# Patient Record
Sex: FEMALE | Race: WHITE | NOT HISPANIC OR LATINO | URBAN - METROPOLITAN AREA
[De-identification: names, ages, dates, MRNs, and addresses within clinical notes are randomized per-mention and may not be internally consistent; named-entity substitution may affect disease eponyms.]

---

## 2017-04-19 ENCOUNTER — OUTPATIENT (OUTPATIENT)
Dept: OUTPATIENT SERVICES | Facility: HOSPITAL | Age: 67
LOS: 1 days | End: 2017-04-19
Payer: COMMERCIAL

## 2017-04-19 PROCEDURE — 93880 EXTRACRANIAL BILAT STUDY: CPT | Mod: 26

## 2017-10-07 ENCOUNTER — TRANSCRIPTION ENCOUNTER (OUTPATIENT)
Age: 67
End: 2017-10-07

## 2019-04-15 PROBLEM — Z00.00 ENCOUNTER FOR PREVENTIVE HEALTH EXAMINATION: Status: ACTIVE | Noted: 2019-04-15

## 2019-04-25 ENCOUNTER — APPOINTMENT (OUTPATIENT)
Dept: MAMMOGRAPHY | Facility: CLINIC | Age: 69
End: 2019-04-25
Payer: MEDICARE

## 2019-04-25 PROCEDURE — 77067 SCR MAMMO BI INCL CAD: CPT

## 2019-08-01 ENCOUNTER — APPOINTMENT (OUTPATIENT)
Dept: MRI IMAGING | Facility: CLINIC | Age: 69
End: 2019-08-01
Payer: MEDICARE

## 2019-08-01 PROCEDURE — 72100 X-RAY EXAM L-S SPINE 2/3 VWS: CPT

## 2019-08-01 PROCEDURE — 72072 X-RAY EXAM THORAC SPINE 3VWS: CPT

## 2020-06-26 ENCOUNTER — APPOINTMENT (OUTPATIENT)
Dept: ULTRASOUND IMAGING | Facility: CLINIC | Age: 70
End: 2020-06-26
Payer: MEDICARE

## 2020-06-26 ENCOUNTER — APPOINTMENT (OUTPATIENT)
Dept: MAMMOGRAPHY | Facility: CLINIC | Age: 70
End: 2020-06-26

## 2020-06-26 PROCEDURE — 77063 BREAST TOMOSYNTHESIS BI: CPT

## 2020-06-26 PROCEDURE — 76641 ULTRASOUND BREAST COMPLETE: CPT | Mod: 50

## 2020-06-26 PROCEDURE — 77067 SCR MAMMO BI INCL CAD: CPT

## 2020-12-15 ENCOUNTER — OUTPATIENT (OUTPATIENT)
Dept: OUTPATIENT SERVICES | Facility: HOSPITAL | Age: 70
LOS: 1 days | End: 2020-12-15

## 2021-10-13 ENCOUNTER — APPOINTMENT (OUTPATIENT)
Dept: ULTRASOUND IMAGING | Facility: CLINIC | Age: 71
End: 2021-10-13

## 2021-10-13 ENCOUNTER — APPOINTMENT (OUTPATIENT)
Dept: RADIOLOGY | Facility: CLINIC | Age: 71
End: 2021-10-13

## 2021-10-13 ENCOUNTER — APPOINTMENT (OUTPATIENT)
Dept: MAMMOGRAPHY | Facility: CLINIC | Age: 71
End: 2021-10-13
Payer: MEDICARE

## 2021-10-13 PROCEDURE — 77080 DXA BONE DENSITY AXIAL: CPT

## 2021-10-13 PROCEDURE — 77067 SCR MAMMO BI INCL CAD: CPT

## 2021-10-13 PROCEDURE — 76641 ULTRASOUND BREAST COMPLETE: CPT | Mod: 50

## 2021-10-13 PROCEDURE — 77063 BREAST TOMOSYNTHESIS BI: CPT

## 2021-10-22 ENCOUNTER — APPOINTMENT (OUTPATIENT)
Dept: ULTRASOUND IMAGING | Facility: CLINIC | Age: 71
End: 2021-10-22

## 2021-10-22 ENCOUNTER — APPOINTMENT (OUTPATIENT)
Dept: MAMMOGRAPHY | Facility: CLINIC | Age: 71
End: 2021-10-22
Payer: MEDICARE

## 2021-10-22 PROCEDURE — G0279: CPT | Mod: LT

## 2021-10-22 PROCEDURE — 77065 DX MAMMO INCL CAD UNI: CPT | Mod: LT

## 2022-09-15 ENCOUNTER — RESULT REVIEW (OUTPATIENT)
Age: 72
End: 2022-09-15

## 2022-10-06 ENCOUNTER — APPOINTMENT (OUTPATIENT)
Dept: MAMMOGRAPHY | Facility: CLINIC | Age: 72
End: 2022-10-06

## 2022-10-06 PROCEDURE — 77067 SCR MAMMO BI INCL CAD: CPT

## 2022-10-06 PROCEDURE — 77063 BREAST TOMOSYNTHESIS BI: CPT

## 2022-11-22 ENCOUNTER — OFFICE (OUTPATIENT)
Dept: URBAN - METROPOLITAN AREA CLINIC 38 | Facility: CLINIC | Age: 72
Setting detail: OPHTHALMOLOGY
End: 2022-11-22
Payer: MEDICARE

## 2022-11-22 DIAGNOSIS — H16.223: ICD-10-CM

## 2022-11-22 DIAGNOSIS — H40.1131: ICD-10-CM

## 2022-11-22 DIAGNOSIS — H26.492: ICD-10-CM

## 2022-11-22 DIAGNOSIS — Z96.1: ICD-10-CM

## 2022-11-22 DIAGNOSIS — G43.809: ICD-10-CM

## 2022-11-22 PROCEDURE — 99214 OFFICE O/P EST MOD 30 MIN: CPT | Performed by: OPHTHALMOLOGY

## 2022-11-22 PROCEDURE — 92133 CPTRZD OPH DX IMG PST SGM ON: CPT | Performed by: OPHTHALMOLOGY

## 2022-11-22 PROCEDURE — 92083 EXTENDED VISUAL FIELD XM: CPT | Performed by: OPHTHALMOLOGY

## 2022-11-22 ASSESSMENT — REFRACTION_MANIFEST
OS_VA1: 20/20
OD_ADD: +2.75
OS_AXIS: 100
OS_CYLINDER: -2.00
OS_CYLINDER: -1.75
OD_VA2: 20/20(J1+)
OS_SPHERE: +0.75
OD_ADD: +2.75
OS_AXIS: 090
OD_SPHERE: +0.50
OS_VA2: 20/20(J1+)
OD_AXIS: 100
OD_VA1: 20/20
OS_VA1: 20/20-
OD_SPHERE: +0.50
OD_CYLINDER: -2.00
OS_ADD: +2.75
OD_VA1: 20/20
OD_CYLINDER: -1.75
OS_SPHERE: +1.00
OS_ADD: +2.75
OD_AXIS: 095

## 2022-11-22 ASSESSMENT — AXIALLENGTH_DERIVED
OS_AL: 23.3196
OD_AL: 23.5087
OS_AL: 23.3673
OD_AL: 23.5087
OD_AL: 23.5572
OS_AL: 23.4632

## 2022-11-22 ASSESSMENT — REFRACTION_CURRENTRX
OD_OVR_VA: 20/
OS_VPRISM_DIRECTION: SV
OD_SPHERE: +2.50
OS_ADD: +2.75
OD_VPRISM_DIRECTION: PROGS
OS_SPHERE: +2.50
OS_OVR_VA: 20/
OD_ADD: +2.75
OS_SPHERE: +1.00
OS_CYLINDER: -1.75
OS_AXIS: 097
OD_OVR_VA: 20/
OS_OVR_VA: 20/
OS_VPRISM_DIRECTION: PROGS
OD_CYLINDER: -1.75
OD_SPHERE: +0.50
OD_VPRISM_DIRECTION: SV
OD_AXIS: 097

## 2022-11-22 ASSESSMENT — REFRACTION_AUTOREFRACTION
OS_CYLINDER: -2.75
OD_CYLINDER: -2.25
OD_AXIS: 091
OS_SPHERE: +1.00
OD_SPHERE: +0.75
OS_AXIS: 090

## 2022-11-22 ASSESSMENT — SPHEQUIV_DERIVED
OD_SPHEQUIV: -0.375
OS_SPHEQUIV: 0
OS_SPHEQUIV: -0.125
OD_SPHEQUIV: -0.375
OS_SPHEQUIV: -0.375
OD_SPHEQUIV: -0.5

## 2022-11-22 ASSESSMENT — KERATOMETRY
METHOD_AUTO_MANUAL: AUTO
OD_K2POWER_DIOPTERS: 45.00
OD_AXISANGLE_DEGREES: 001
OS_AXISANGLE_DEGREES: 002
OD_K1POWER_DIOPTERS: 43.25
OS_K2POWER_DIOPTERS: 45.50
OS_K1POWER_DIOPTERS: 43.00

## 2022-11-22 ASSESSMENT — PACHYMETRY
OD_CT_UM: 508
OS_CT_UM: 502
OS_CT_CORRECTION: 3
OD_CT_CORRECTION: 3

## 2022-11-22 ASSESSMENT — CONFRONTATIONAL VISUAL FIELD TEST (CVF)
OD_FINDINGS: FULL
OS_FINDINGS: FULL

## 2022-11-22 ASSESSMENT — VISUAL ACUITY
OS_BCVA: 20/25-1
OD_BCVA: 20/25-1

## 2022-11-22 ASSESSMENT — SUPERFICIAL PUNCTATE KERATITIS (SPK)
OD_SPK: T
OS_SPK: T

## 2022-11-22 ASSESSMENT — TONOMETRY
OD_IOP_MMHG: 17
OS_IOP_MMHG: 17

## 2023-03-21 ENCOUNTER — OFFICE (OUTPATIENT)
Dept: URBAN - METROPOLITAN AREA CLINIC 38 | Facility: CLINIC | Age: 73
Setting detail: OPHTHALMOLOGY
End: 2023-03-21
Payer: MEDICARE

## 2023-03-21 VITALS — HEIGHT: 55 IN

## 2023-03-21 DIAGNOSIS — H26.492: ICD-10-CM

## 2023-03-21 DIAGNOSIS — H35.363: ICD-10-CM

## 2023-03-21 DIAGNOSIS — H43.812: ICD-10-CM

## 2023-03-21 DIAGNOSIS — H16.223: ICD-10-CM

## 2023-03-21 DIAGNOSIS — Z96.1: ICD-10-CM

## 2023-03-21 DIAGNOSIS — H40.1131: ICD-10-CM

## 2023-03-21 PROCEDURE — 92250 FUNDUS PHOTOGRAPHY W/I&R: CPT | Performed by: OPTOMETRIST

## 2023-03-21 PROCEDURE — 92014 COMPRE OPH EXAM EST PT 1/>: CPT | Performed by: OPTOMETRIST

## 2023-03-21 ASSESSMENT — REFRACTION_MANIFEST
OD_VA2: 20/20(J1+)
OD_ADD: +2.25
OS_ADD: +2.75
OS_CYLINDER: -1.75
OU_VA: 20/20
OS_VA2: 20/20(J1+)
OS_AXIS: 090
OD_AXIS: 090
OD_CYLINDER: -1.75
OD_ADD: +2.75
OD_VA1: 20/20
OD_SPHERE: +0.75
OS_SPHERE: +0.75
OS_ADD: +2.25
OD_SPHERE: +0.50
OD_AXIS: 095
OS_AXIS: 090
OS_VA1: 20/20
OS_CYLINDER: -1.50
OS_VA1: 20/20
OD_VA1: 20/20
OD_CYLINDER: -1.75
OS_SPHERE: +0.75

## 2023-03-21 ASSESSMENT — REFRACTION_CURRENTRX
OD_ADD: +2.25
OD_OVR_VA: 20/
OS_SPHERE: +2.50
OD_SPHERE: +0.75
OS_OVR_VA: 20/
OS_ADD: +2.25
OD_CYLINDER: -1.75
OS_SPHERE: +0.75
OS_AXIS: 099
OD_AXIS: 110
OD_VPRISM_DIRECTION: SV
OS_VPRISM_DIRECTION: PROGS
OD_VPRISM_DIRECTION: PROGS
OD_OVR_VA: 20/
OS_OVR_VA: 20/
OS_CYLINDER: -1.50
OD_SPHERE: +2.50
OS_VPRISM_DIRECTION: SV

## 2023-03-21 ASSESSMENT — SPHEQUIV_DERIVED
OS_SPHEQUIV: -0.125
OD_SPHEQUIV: -0.125
OS_SPHEQUIV: 0
OD_SPHEQUIV: -0.125
OD_SPHEQUIV: -0.375
OS_SPHEQUIV: -0.5

## 2023-03-21 ASSESSMENT — SUPERFICIAL PUNCTATE KERATITIS (SPK)
OS_SPK: T
OD_SPK: T

## 2023-03-21 ASSESSMENT — REFRACTION_AUTOREFRACTION
OD_AXIS: 090
OS_AXIS: 091
OS_CYLINDER: -3.00
OD_SPHERE: +1.00
OD_CYLINDER: -2.25
OS_SPHERE: +1.00

## 2023-03-21 ASSESSMENT — KERATOMETRY
OS_AXISANGLE_DEGREES: 004
METHOD_AUTO_MANUAL: AUTO
OD_K1POWER_DIOPTERS: 43.50
OS_K1POWER_DIOPTERS: 43.25
OS_K2POWER_DIOPTERS: 45.25
OD_K2POWER_DIOPTERS: 45.00
OD_AXISANGLE_DEGREES: 004

## 2023-03-21 ASSESSMENT — AXIALLENGTH_DERIVED
OD_AL: 23.3673
OD_AL: 23.4632
OS_AL: 23.5115
OD_AL: 23.3673
OS_AL: 23.3196
OS_AL: 23.3673

## 2023-03-21 ASSESSMENT — VISUAL ACUITY
OS_BCVA: 20/20-
OD_BCVA: 20/25

## 2023-03-21 ASSESSMENT — CONFRONTATIONAL VISUAL FIELD TEST (CVF)
OD_FINDINGS: FULL
OS_FINDINGS: FULL

## 2023-03-21 ASSESSMENT — PACHYMETRY
OD_CT_UM: 508
OS_CT_CORRECTION: 3
OD_CT_CORRECTION: 3
OS_CT_UM: 502

## 2023-03-21 ASSESSMENT — TONOMETRY
OS_IOP_MMHG: 17
OD_IOP_MMHG: 13

## 2023-05-17 PROBLEM — H40.032 NARROW ANGLE; LEFT EYE: Status: ACTIVE | Noted: 2023-05-17

## 2023-09-19 ENCOUNTER — OFFICE (OUTPATIENT)
Dept: URBAN - METROPOLITAN AREA CLINIC 38 | Facility: CLINIC | Age: 73
Setting detail: OPHTHALMOLOGY
End: 2023-09-19
Payer: MEDICARE

## 2023-09-19 VITALS — HEIGHT: 55 IN

## 2023-09-19 DIAGNOSIS — H16.223: ICD-10-CM

## 2023-09-19 DIAGNOSIS — H35.363: ICD-10-CM

## 2023-09-19 DIAGNOSIS — H40.1131: ICD-10-CM

## 2023-09-19 DIAGNOSIS — H43.812: ICD-10-CM

## 2023-09-19 DIAGNOSIS — H26.492: ICD-10-CM

## 2023-09-19 PROCEDURE — 99213 OFFICE O/P EST LOW 20 MIN: CPT | Performed by: OPTOMETRIST

## 2023-09-19 PROCEDURE — 92133 CPTRZD OPH DX IMG PST SGM ON: CPT | Performed by: OPTOMETRIST

## 2023-09-19 ASSESSMENT — SPHEQUIV_DERIVED
OD_SPHEQUIV: -0.375
OD_SPHEQUIV: -0.125
OS_SPHEQUIV: 0
OD_SPHEQUIV: -0.125
OS_SPHEQUIV: -0.125
OS_SPHEQUIV: -0.5

## 2023-09-19 ASSESSMENT — REFRACTION_MANIFEST
OD_CYLINDER: -1.75
OD_VA1: 20/20
OS_VA2: 20/20(J1+)
OD_SPHERE: +0.75
OU_VA: 20/20
OS_AXIS: 090
OD_SPHERE: +0.50
OD_AXIS: 095
OD_VA2: 20/20(J1+)
OS_VA1: 20/20
OS_SPHERE: +0.75
OS_ADD: +2.25
OS_VA1: 20/20
OD_CYLINDER: -1.75
OD_VA1: 20/20
OS_SPHERE: +0.75
OS_ADD: +2.75
OS_CYLINDER: -1.75
OD_ADD: +2.75
OS_AXIS: 090
OD_AXIS: 090
OS_CYLINDER: -1.50
OD_ADD: +2.25

## 2023-09-19 ASSESSMENT — SUPERFICIAL PUNCTATE KERATITIS (SPK)
OS_SPK: 1+
OD_SPK: 1+

## 2023-09-19 ASSESSMENT — REFRACTION_CURRENTRX
OD_ADD: +2.25
OD_SPHERE: +2.50
OS_OVR_VA: 20/
OS_CYLINDER: -1.50
OS_VPRISM_DIRECTION: PROGS
OS_OVR_VA: 20/
OS_SPHERE: +2.50
OS_VPRISM_DIRECTION: SV
OD_VPRISM_DIRECTION: PROGS
OS_ADD: +2.25
OD_AXIS: 110
OD_OVR_VA: 20/
OD_VPRISM_DIRECTION: SV
OD_SPHERE: +0.75
OS_AXIS: 099
OS_SPHERE: +0.75
OD_CYLINDER: -1.75
OD_OVR_VA: 20/

## 2023-09-19 ASSESSMENT — CONFRONTATIONAL VISUAL FIELD TEST (CVF)
OS_FINDINGS: FULL
OD_FINDINGS: FULL

## 2023-09-19 ASSESSMENT — KERATOMETRY
OD_K2POWER_DIOPTERS: 45.00
OS_AXISANGLE_DEGREES: 004
OS_K1POWER_DIOPTERS: 43.25
OD_K1POWER_DIOPTERS: 43.50
OS_K2POWER_DIOPTERS: 45.25
METHOD_AUTO_MANUAL: AUTO
OD_AXISANGLE_DEGREES: 004

## 2023-09-19 ASSESSMENT — AXIALLENGTH_DERIVED
OS_AL: 23.5115
OS_AL: 23.3196
OD_AL: 23.3673
OD_AL: 23.4632
OS_AL: 23.3673
OD_AL: 23.3673

## 2023-09-19 ASSESSMENT — REFRACTION_AUTOREFRACTION
OS_CYLINDER: -3.00
OD_CYLINDER: -2.25
OS_AXIS: 091
OD_SPHERE: +1.00
OS_SPHERE: +1.00
OD_AXIS: 090

## 2023-09-19 ASSESSMENT — PACHYMETRY
OD_CT_UM: 508
OS_CT_CORRECTION: 3
OS_CT_UM: 502
OD_CT_CORRECTION: 3

## 2023-09-19 ASSESSMENT — VISUAL ACUITY
OD_BCVA: 20/20
OS_BCVA: 20/20-

## 2023-10-11 ENCOUNTER — APPOINTMENT (OUTPATIENT)
Dept: MAMMOGRAPHY | Facility: CLINIC | Age: 73
End: 2023-10-11
Payer: MEDICARE

## 2023-10-11 PROCEDURE — 77063 BREAST TOMOSYNTHESIS BI: CPT | Mod: 26

## 2023-10-11 PROCEDURE — 77067 SCR MAMMO BI INCL CAD: CPT | Mod: 26

## 2023-10-17 ENCOUNTER — RESULT REVIEW (OUTPATIENT)
Age: 73
End: 2023-10-17

## 2023-10-17 DIAGNOSIS — E04.2 NONTOXIC MULTINODULAR GOITER: ICD-10-CM

## 2024-02-01 ENCOUNTER — APPOINTMENT (OUTPATIENT)
Dept: ENDOCRINOLOGY | Facility: CLINIC | Age: 74
End: 2024-02-01
Payer: MEDICARE

## 2024-02-01 VITALS
HEART RATE: 78 BPM | HEIGHT: 64 IN | TEMPERATURE: 98.4 F | RESPIRATION RATE: 12 BRPM | WEIGHT: 129 LBS | OXYGEN SATURATION: 96 % | SYSTOLIC BLOOD PRESSURE: 110 MMHG | DIASTOLIC BLOOD PRESSURE: 64 MMHG | BODY MASS INDEX: 22.02 KG/M2

## 2024-02-01 PROCEDURE — 99213 OFFICE O/P EST LOW 20 MIN: CPT

## 2024-02-01 RX ORDER — VITAMIN E 200 UNIT
500 CAPSULE ORAL
Refills: 0 | Status: ACTIVE | COMMUNITY

## 2024-02-01 RX ORDER — MULTIVIT-MIN/IRON/FOLIC ACID/K 18-600-40
CAPSULE ORAL
Refills: 0 | Status: ACTIVE | COMMUNITY

## 2024-02-01 RX ORDER — ROSUVASTATIN CALCIUM 20 MG/1
20 TABLET, FILM COATED ORAL
Refills: 0 | Status: ACTIVE | COMMUNITY

## 2024-02-01 RX ORDER — TRAVOPROST 0.04 MG/ML
0 SOLUTION/ DROPS OPHTHALMIC
Refills: 0 | Status: ACTIVE | COMMUNITY

## 2024-02-01 RX ORDER — ASPIRIN 81 MG
81 TABLET, DELAYED RELEASE (ENTERIC COATED) ORAL
Refills: 0 | Status: ACTIVE | COMMUNITY

## 2024-02-01 RX ORDER — CALCIUM CITRATE/VITAMIN D3 315MG-6.25
TABLET ORAL
Refills: 0 | Status: ACTIVE | COMMUNITY

## 2024-02-01 NOTE — PHYSICAL EXAM
[Alert] : alert [Well Nourished] : well nourished [No Acute Distress] : no acute distress [Well Developed] : well developed [Normal Sclera/Conjunctiva] : normal sclera/conjunctiva [EOMI] : extra ocular movement intact [No Proptosis] : no proptosis [Normal Oropharynx] : the oropharynx was normal [Thyroid Not Enlarged] : the thyroid was not enlarged [No Thyroid Nodules] : no palpable thyroid nodules [No Respiratory Distress] : no respiratory distress [No Accessory Muscle Use] : no accessory muscle use [Clear to Auscultation] : lungs were clear to auscultation bilaterally [Normal S1, S2] : normal S1 and S2 [Normal Rate] : heart rate was normal [Regular Rhythm] : with a regular rhythm [No Edema] : no peripheral edema [Pedal Pulses Normal] : the pedal pulses are present [Normal Bowel Sounds] : normal bowel sounds [Not Tender] : non-tender [Not Distended] : not distended [Soft] : abdomen soft [Normal Anterior Cervical Nodes] : no anterior cervical lymphadenopathy [Normal Posterior Cervical Nodes] : no posterior cervical lymphadenopathy [No Spinal Tenderness] : no spinal tenderness [Spine Straight] : spine straight [No Stigmata of Cushings Syndrome] : no stigmata of Cushings Syndrome [Normal Gait] : normal gait [Normal Strength/Tone] : muscle strength and tone were normal [No Rash] : no rash [Acanthosis Nigricans] : no acanthosis nigricans [Normal Reflexes] : deep tendon reflexes were 2+ and symmetric [No Tremors] : no tremors [Oriented x3] : oriented to person, place, and time [de-identified] : Bilaterally palpable thyroid nodules especially in the right middle pole which appears to be more than 1-1/2 cm in size smooth and nontender and on the left lower pole smaller size nodule which is also nontender.

## 2024-02-01 NOTE — HISTORY OF PRESENT ILLNESS
[FreeTextEntry1] : Patient last seen 4/18/2023 most recent labs dated for 10/05/2023 from [facility], US thyroid from 9/15/2022. This is a pleasant 74-year-old white female with a past medical history of a nontoxic multinodular goiter presents for routine follow-up and evaluation.  Patient was last seen in the office in the year 2022.  She denies any significant symptoms of difficulty swallowing hoarseness or weight loss.  She has not noticed any palpitations chest pain, heat or cold intolerance, or any hair loss.  Physically she is active.  She does complain of pain and numbness of the left arm especially when she wakes up in the morning.  Has a history of a fine-needle aspiration biopsy of the thyroid nodules few years ago which was reported to be benign.

## 2024-02-01 NOTE — ASSESSMENT
[FreeTextEntry1] : Pleasant middle-age white female with no significant past medical history except for hyperlipidemia and a multinodular goiter presents for routine follow-up.  She denies any signs or symptoms of hypo or hyperthyroidism.  Patient had a blood test performed in October 2023 by her primary care physician which showed that the TSH level was normal at 1.80.  Physical exam appears to be stable and I do not appreciate any significant change in the size of the thyroid nodules.  Her last sonogram was performed into the timbre of 2022.  Recommendation 1.  I have advised the patient to obtain a follow-up sonogram of the thyroid so that we can assess for any significant changes in the size and structure of the thyroid nodules 2.  If the nodules are stable then we would not perform any fine-needle aspiration biopsy. 3.  Patient currently does not require any intervention with thyroid medications that the TSH level is normal. 4.  If the patient condition is stable then she will return for follow-up evaluation in 1 years time.  The plan discussed with the patient thank you

## 2024-02-15 ENCOUNTER — RESULT REVIEW (OUTPATIENT)
Age: 74
End: 2024-02-15

## 2024-02-15 ENCOUNTER — APPOINTMENT (OUTPATIENT)
Dept: ULTRASOUND IMAGING | Facility: CLINIC | Age: 74
End: 2024-02-15
Payer: MEDICARE

## 2024-02-15 PROCEDURE — 76536 US EXAM OF HEAD AND NECK: CPT

## 2024-03-19 ENCOUNTER — OFFICE (OUTPATIENT)
Dept: URBAN - METROPOLITAN AREA CLINIC 38 | Facility: CLINIC | Age: 74
Setting detail: OPHTHALMOLOGY
End: 2024-03-19
Payer: MEDICARE

## 2024-03-19 DIAGNOSIS — H40.1131: ICD-10-CM

## 2024-03-19 DIAGNOSIS — H26.492: ICD-10-CM

## 2024-03-19 DIAGNOSIS — H43.812: ICD-10-CM

## 2024-03-19 DIAGNOSIS — H16.223: ICD-10-CM

## 2024-03-19 DIAGNOSIS — Z96.1: ICD-10-CM

## 2024-03-19 DIAGNOSIS — H35.363: ICD-10-CM

## 2024-03-19 PROBLEM — H35.40 PERIPAPILLARY ATROPHY ; BOTH EYES: Status: ACTIVE | Noted: 2024-03-19

## 2024-03-19 PROCEDURE — 92250 FUNDUS PHOTOGRAPHY W/I&R: CPT | Performed by: OPTOMETRIST

## 2024-03-19 PROCEDURE — 92014 COMPRE OPH EXAM EST PT 1/>: CPT | Performed by: OPTOMETRIST

## 2024-03-19 ASSESSMENT — REFRACTION_CURRENTRX
OS_ADD: +3.00
OS_VPRISM_DIRECTION: PROGS
OS_CYLINDER: -1.75
OD_CYLINDER: -1.75
OS_SPHERE: +0.75
OD_AXIS: 110
OD_OVR_VA: 20/
OD_VPRISM_DIRECTION: PROGS
OD_OVR_VA: 20/
OS_CYLINDER: -1.50
OS_OVR_VA: 20/
OD_SPHERE: +0.75
OD_VPRISM_DIRECTION: PROGS
OD_ADD: +3.25
OS_OVR_VA: 20/
OS_AXIS: 099
OD_CYLINDER: -1.75
OD_AXIS: 091
OD_SPHERE: +0.50
OS_VPRISM_DIRECTION: PROGS
OD_ADD: +2.25
OS_ADD: +2.25
OS_AXIS: 097
OS_SPHERE: +1.00

## 2024-03-19 ASSESSMENT — REFRACTION_MANIFEST
OS_AXIS: 090
OD_ADD: +2.75
OD_CYLINDER: -1.75
OD_VA2: 20/20(J1+)
OS_CYLINDER: -1.75
OS_VA2: 20/20(J1+)
OD_VA1: 20/20
OD_CYLINDER: -1.75
OS_ADD: +2.25
OS_ADD: +2.75
OS_VA1: 20/20
OD_AXIS: 090
OS_AXIS: 090
OD_VA1: 20/20
OS_SPHERE: +0.75
OD_AXIS: 095
OS_SPHERE: +0.75
OD_SPHERE: +0.50
OS_VA1: 20/20
OD_ADD: +2.25
OS_CYLINDER: -1.50
OU_VA: 20/20
OD_SPHERE: +0.75

## 2024-03-19 ASSESSMENT — SPHEQUIV_DERIVED
OS_SPHEQUIV: 0
OD_SPHEQUIV: -0.375
OD_SPHEQUIV: -0.125
OS_SPHEQUIV: -0.125

## 2024-07-21 ENCOUNTER — NON-APPOINTMENT (OUTPATIENT)
Age: 74
End: 2024-07-21

## 2024-08-12 ENCOUNTER — NON-APPOINTMENT (OUTPATIENT)
Age: 74
End: 2024-08-12

## 2024-11-09 ENCOUNTER — APPOINTMENT (OUTPATIENT)
Dept: RADIOLOGY | Facility: CLINIC | Age: 74
End: 2024-11-09
Payer: MEDICARE

## 2024-11-09 ENCOUNTER — APPOINTMENT (OUTPATIENT)
Dept: MAMMOGRAPHY | Facility: CLINIC | Age: 74
End: 2024-11-09

## 2024-11-09 ENCOUNTER — RESULT REVIEW (OUTPATIENT)
Age: 74
End: 2024-11-09

## 2024-11-09 PROCEDURE — 77063 BREAST TOMOSYNTHESIS BI: CPT | Mod: TC

## 2024-11-09 PROCEDURE — 77067 SCR MAMMO BI INCL CAD: CPT | Mod: TC

## 2024-11-09 PROCEDURE — 77080 DXA BONE DENSITY AXIAL: CPT

## 2024-11-19 ENCOUNTER — APPOINTMENT (OUTPATIENT)
Dept: MAMMOGRAPHY | Facility: CLINIC | Age: 74
End: 2024-11-19
Payer: MEDICARE

## 2024-11-19 ENCOUNTER — RESULT REVIEW (OUTPATIENT)
Age: 74
End: 2024-11-19

## 2024-11-19 ENCOUNTER — APPOINTMENT (OUTPATIENT)
Dept: ULTRASOUND IMAGING | Facility: CLINIC | Age: 74
End: 2024-11-19

## 2024-11-19 PROCEDURE — 77065 DX MAMMO INCL CAD UNI: CPT | Mod: LT

## 2024-11-19 PROCEDURE — 76642 ULTRASOUND BREAST LIMITED: CPT | Mod: LT

## 2024-11-19 PROCEDURE — G0279: CPT | Mod: LT

## 2025-03-22 ENCOUNTER — OFFICE (OUTPATIENT)
Dept: URBAN - METROPOLITAN AREA CLINIC 38 | Facility: CLINIC | Age: 75
Setting detail: OPHTHALMOLOGY
End: 2025-03-22
Payer: MEDICARE

## 2025-03-22 DIAGNOSIS — H43.812: ICD-10-CM

## 2025-03-22 DIAGNOSIS — H40.1122: ICD-10-CM

## 2025-03-22 DIAGNOSIS — H40.1111: ICD-10-CM

## 2025-03-22 DIAGNOSIS — H35.363: ICD-10-CM

## 2025-03-22 DIAGNOSIS — H26.492: ICD-10-CM

## 2025-03-22 PROCEDURE — 92133 CPTRZD OPH DX IMG PST SGM ON: CPT

## 2025-03-22 PROCEDURE — 92250 FUNDUS PHOTOGRAPHY W/I&R: CPT

## 2025-03-22 PROCEDURE — 92014 COMPRE OPH EXAM EST PT 1/>: CPT

## 2025-03-22 ASSESSMENT — PACHYMETRY
OS_CT_UM: 502
OD_CT_CORRECTION: 3
OD_CT_UM: 508
OS_CT_CORRECTION: 3

## 2025-03-22 ASSESSMENT — TONOMETRY
OD_IOP_MMHG: 16
OS_IOP_MMHG: 20

## 2025-03-22 ASSESSMENT — CONFRONTATIONAL VISUAL FIELD TEST (CVF)
OS_FINDINGS: FULL
OD_FINDINGS: FULL

## 2025-03-24 ASSESSMENT — REFRACTION_CURRENTRX
OS_SPHERE: +1.00
OS_OVR_VA: 20/
OS_ADD: +3.00
OD_VPRISM_DIRECTION: PROGS
OD_OVR_VA: 20/
OD_VPRISM_DIRECTION: PROGS
OD_SPHERE: +0.50
OD_CYLINDER: -1.75
OD_SPHERE: +0.75
OS_VPRISM_DIRECTION: PROGS
OS_AXIS: 097
OD_ADD: +2.75
OS_VPRISM_DIRECTION: PROGS
OS_CYLINDER: -1.50
OS_ADD: +2.25
OD_OVR_VA: 20/
OD_AXIS: 085
OS_OVR_VA: 20/
OS_SPHERE: +0.75
OD_AXIS: 091
OD_AXIS: 110
OS_SPHERE: +0.75
OD_SPHERE: +0.50
OD_CYLINDER: -1.75
OS_CYLINDER: -1.75
OD_VPRISM_DIRECTION: PROGS
OD_OVR_VA: 20/
OS_OVR_VA: 20/
OS_CYLINDER: -1.50
OS_VPRISM_DIRECTION: PROGS
OD_ADD: +2.25
OS_AXIS: 099
OD_CYLINDER: -1.75
OS_ADD: +2.75
OS_AXIS: 087
OD_ADD: +3.25

## 2025-03-24 ASSESSMENT — SUPERFICIAL PUNCTATE KERATITIS (SPK)
OS_SPK: T 1+
OD_SPK: T 1+

## 2025-03-24 ASSESSMENT — REFRACTION_MANIFEST
OS_CYLINDER: -1.50
OS_VA1: 20/20
OS_ADD: +2.75
OS_VA1: 20/20
OU_VA: 20/20
OD_SPHERE: +0.50
OS_VA2: 20/20(J1+)
OD_VA1: 20/20
OS_CYLINDER: -1.75
OD_CYLINDER: -1.75
OD_ADD: +2.75
OS_AXIS: 090
OD_VA2: 20/20(J1+)
OD_VA1: 20/20
OD_CYLINDER: -1.75
OS_AXIS: 090
OD_ADD: +2.25
OD_AXIS: 090
OS_SPHERE: +0.75
OD_AXIS: 095
OD_SPHERE: +0.75
OS_SPHERE: +0.75
OS_ADD: +2.25

## 2025-03-24 ASSESSMENT — KERATOMETRY
OD_K1POWER_DIOPTERS: 43.00
METHOD_AUTO_MANUAL: AUTO
OD_AXISANGLE_DEGREES: 002
OS_K1POWER_DIOPTERS: 43.00
OS_AXISANGLE_DEGREES: 004
OD_K2POWER_DIOPTERS: 46.75
OS_K2POWER_DIOPTERS: 45.50

## 2025-03-24 ASSESSMENT — REFRACTION_AUTOREFRACTION
OS_SPHERE: +1.50
OD_SPHERE: +0.75
OD_AXIS: 090
OS_AXIS: 091
OS_CYLINDER: -3.25
OD_CYLINDER: -2.50

## 2025-03-24 ASSESSMENT — VISUAL ACUITY
OD_BCVA: 20/25-2
OS_BCVA: 20/20

## 2025-05-02 ENCOUNTER — RX ONLY (RX ONLY)
Age: 75
End: 2025-05-02

## 2025-05-02 ENCOUNTER — OFFICE (OUTPATIENT)
Dept: URBAN - METROPOLITAN AREA CLINIC 38 | Facility: CLINIC | Age: 75
Setting detail: OPHTHALMOLOGY
End: 2025-05-02
Payer: MEDICARE

## 2025-05-02 DIAGNOSIS — H40.1111: ICD-10-CM

## 2025-05-02 DIAGNOSIS — H40.1122: ICD-10-CM

## 2025-05-02 PROCEDURE — 92020 GONIOSCOPY: CPT | Performed by: STUDENT IN AN ORGANIZED HEALTH CARE EDUCATION/TRAINING PROGRAM

## 2025-05-02 PROCEDURE — 99214 OFFICE O/P EST MOD 30 MIN: CPT | Performed by: STUDENT IN AN ORGANIZED HEALTH CARE EDUCATION/TRAINING PROGRAM

## 2025-05-02 PROCEDURE — 92133 CPTRZD OPH DX IMG PST SGM ON: CPT | Performed by: STUDENT IN AN ORGANIZED HEALTH CARE EDUCATION/TRAINING PROGRAM

## 2025-05-02 PROCEDURE — 92083 EXTENDED VISUAL FIELD XM: CPT | Performed by: STUDENT IN AN ORGANIZED HEALTH CARE EDUCATION/TRAINING PROGRAM

## 2025-05-02 ASSESSMENT — KERATOMETRY
OS_AXISANGLE_DEGREES: 180
OD_K2POWER_DIOPTERS: 45.00
METHOD_AUTO_MANUAL: AUTO
OS_K2POWER_DIOPTERS: 45.25
OS_K1POWER_DIOPTERS: 43.25
OD_K1POWER_DIOPTERS: 43.00
OD_AXISANGLE_DEGREES: 178

## 2025-05-02 ASSESSMENT — REFRACTION_MANIFEST
OS_VA1: 20/20
OD_CYLINDER: -1.75
OD_SPHERE: +0.25
OS_SPHERE: +0.50
OS_CYLINDER: -1.75
OD_AXIS: 095
OD_CYLINDER: -1.75
OS_AXIS: 095
OS_SPHERE: +0.75
OD_SPHERE: +0.50
OS_AXIS: 090
OD_VA1: 20/20
OD_AXIS: 090
OU_VA: 20/20-2
OD_VA2: 20/20
OS_VA1: 20/20
OS_ADD: +2.75
OS_VA2: 20/20
OS_CYLINDER: -1.75
OD_ADD: +2.75
OS_ADD: +2.75
OD_VA1: 20/20
OD_ADD: +2.75

## 2025-05-02 ASSESSMENT — REFRACTION_CURRENTRX
OD_OVR_VA: 20/
OS_CYLINDER: -1.75
OS_ADD: +3.00
OS_SPHERE: +0.75
OD_VPRISM_DIRECTION: PROGS
OD_OVR_VA: 20/
OD_AXIS: 112
OD_ADD: +3.25
OD_OVR_VA: 20/
OS_AXIS: 097
OS_AXIS: 097
OS_ADD: +2.75
OS_OVR_VA: 20/
OD_VPRISM_DIRECTION: PROGS
OD_ADD: +2.75
OD_AXIS: 091
OS_VPRISM_DIRECTION: PROGS
OS_CYLINDER: -1.50
OD_CYLINDER: -1.75
OS_SPHERE: +1.00
OD_AXIS: 085
OD_ADD: +2.75
OD_CYLINDER: -1.75
OS_CYLINDER: -1.50
OS_ADD: +2.75
OD_SPHERE: +0.50
OS_AXIS: 087
OS_SPHERE: +0.75
OS_OVR_VA: 20/
OD_VPRISM_DIRECTION: PROGS
OS_OVR_VA: 20/
OD_SPHERE: +0.75
OD_CYLINDER: -1.75
OD_SPHERE: +0.50
OS_VPRISM_DIRECTION: PROGS
OS_VPRISM_DIRECTION: PROGS

## 2025-05-02 ASSESSMENT — REFRACTION_AUTOREFRACTION
OS_SPHERE: +1.25
OS_CYLINDER: -2.50
OD_SPHERE: +1.00
OS_AXIS: 091
OD_CYLINDER: -2.25
OD_AXIS: 087

## 2025-05-02 ASSESSMENT — CONFRONTATIONAL VISUAL FIELD TEST (CVF)
OD_FINDINGS: FULL
OS_FINDINGS: FULL

## 2025-05-02 ASSESSMENT — TONOMETRY
OD_IOP_MMHG: 17
OS_IOP_MMHG: 17

## 2025-05-02 ASSESSMENT — PACHYMETRY
OD_CT_CORRECTION: 3
OS_CT_CORRECTION: 3
OD_CT_UM: 508
OS_CT_UM: 502

## 2025-05-02 ASSESSMENT — SUPERFICIAL PUNCTATE KERATITIS (SPK)
OD_SPK: T 1+
OS_SPK: T 1+

## 2025-05-02 ASSESSMENT — VISUAL ACUITY
OD_BCVA: 20/25-2
OS_BCVA: 20/20

## 2025-05-23 ENCOUNTER — OFFICE (OUTPATIENT)
Dept: URBAN - METROPOLITAN AREA CLINIC 38 | Facility: CLINIC | Age: 75
Setting detail: OPHTHALMOLOGY
End: 2025-05-23
Payer: MEDICARE

## 2025-05-23 DIAGNOSIS — H40.1122: ICD-10-CM

## 2025-05-23 PROCEDURE — 65855 TRABECULOPLASTY LASER SURG: CPT | Mod: LT | Performed by: STUDENT IN AN ORGANIZED HEALTH CARE EDUCATION/TRAINING PROGRAM

## 2025-05-23 ASSESSMENT — REFRACTION_MANIFEST
OD_SPHERE: +0.50
OD_AXIS: 090
OS_VA1: 20/20
OS_SPHERE: +0.50
OD_CYLINDER: -1.75
OD_VA2: 20/20
OS_VA2: 20/20
OS_ADD: +2.75
OS_CYLINDER: -1.75
OD_CYLINDER: -1.75
OD_VA1: 20/20
OD_AXIS: 095
OD_ADD: +2.75
OU_VA: 20/20-2
OS_CYLINDER: -1.75
OS_SPHERE: +0.75
OS_AXIS: 095
OD_ADD: +2.75
OS_AXIS: 090
OD_SPHERE: +0.25
OD_VA1: 20/20
OS_ADD: +2.75
OS_VA1: 20/20

## 2025-05-23 ASSESSMENT — REFRACTION_CURRENTRX
OS_AXIS: 097
OD_CYLINDER: -1.75
OD_CYLINDER: -1.75
OS_VPRISM_DIRECTION: PROGS
OS_VPRISM_DIRECTION: PROGS
OD_AXIS: 085
OS_SPHERE: +0.75
OD_ADD: +3.25
OD_OVR_VA: 20/
OS_ADD: +3.00
OD_SPHERE: +0.50
OD_ADD: +2.75
OS_SPHERE: +0.75
OD_AXIS: 112
OS_ADD: +2.75
OD_VPRISM_DIRECTION: PROGS
OS_OVR_VA: 20/
OD_OVR_VA: 20/
OS_AXIS: 087
OS_CYLINDER: -1.50
OD_AXIS: 091
OS_OVR_VA: 20/
OD_CYLINDER: -1.75
OS_ADD: +2.75
OS_VPRISM_DIRECTION: PROGS
OD_VPRISM_DIRECTION: PROGS
OD_ADD: +2.75
OS_CYLINDER: -1.50
OS_SPHERE: +1.00
OS_OVR_VA: 20/
OS_CYLINDER: -1.75
OD_SPHERE: +0.75
OD_SPHERE: +0.50
OD_OVR_VA: 20/
OS_AXIS: 097
OD_VPRISM_DIRECTION: PROGS

## 2025-05-23 ASSESSMENT — KERATOMETRY
OD_AXISANGLE_DEGREES: 175
OS_K1POWER_DIOPTERS: 43.50
OD_K2POWER_DIOPTERS: 45.00
OS_K2POWER_DIOPTERS: 45.25
OD_K1POWER_DIOPTERS: 43.25
METHOD_AUTO_MANUAL: AUTO
OS_AXISANGLE_DEGREES: 178

## 2025-05-23 ASSESSMENT — REFRACTION_AUTOREFRACTION
OD_AXIS: 087
OS_CYLINDER: -2.50
OD_CYLINDER: -2.50
OS_SPHERE: +0.75
OS_AXIS: 088
OD_SPHERE: +0.75

## 2025-05-23 ASSESSMENT — VISUAL ACUITY
OS_BCVA: 20/20-2
OD_BCVA: 20/25-2

## 2025-05-23 ASSESSMENT — SUPERFICIAL PUNCTATE KERATITIS (SPK)
OD_SPK: T 1+
OS_SPK: T 1+

## 2025-06-13 ENCOUNTER — OFFICE (OUTPATIENT)
Dept: URBAN - METROPOLITAN AREA CLINIC 38 | Facility: CLINIC | Age: 75
Setting detail: OPHTHALMOLOGY
End: 2025-06-13
Payer: MEDICARE

## 2025-06-13 DIAGNOSIS — H40.1111: ICD-10-CM

## 2025-06-13 PROCEDURE — 65855 TRABECULOPLASTY LASER SURG: CPT | Mod: RT | Performed by: STUDENT IN AN ORGANIZED HEALTH CARE EDUCATION/TRAINING PROGRAM

## 2025-06-13 ASSESSMENT — REFRACTION_CURRENTRX
OD_CYLINDER: -1.75
OD_AXIS: 112
OD_ADD: +3.25
OS_AXIS: 097
OD_SPHERE: +0.75
OD_VPRISM_DIRECTION: PROGS
OD_SPHERE: +0.50
OD_SPHERE: +0.50
OD_ADD: +2.75
OS_AXIS: 097
OD_CYLINDER: -1.75
OD_OVR_VA: 20/
OS_OVR_VA: 20/
OS_VPRISM_DIRECTION: PROGS
OD_AXIS: 091
OD_OVR_VA: 20/
OS_AXIS: 087
OS_SPHERE: +1.00
OS_ADD: +2.75
OS_ADD: +2.75
OS_VPRISM_DIRECTION: PROGS
OS_VPRISM_DIRECTION: PROGS
OD_VPRISM_DIRECTION: PROGS
OS_CYLINDER: -1.75
OS_OVR_VA: 20/
OD_VPRISM_DIRECTION: PROGS
OD_ADD: +2.75
OS_SPHERE: +0.75
OS_ADD: +3.00
OD_CYLINDER: -1.75
OS_CYLINDER: -1.50
OD_AXIS: 085
OS_OVR_VA: 20/
OD_OVR_VA: 20/
OS_CYLINDER: -1.50
OS_SPHERE: +0.75

## 2025-06-13 ASSESSMENT — REFRACTION_MANIFEST
OS_ADD: +2.75
OD_SPHERE: +0.25
OS_ADD: +2.75
OS_VA1: 20/20
OU_VA: 20/20-2
OS_AXIS: 095
OS_VA2: 20/20
OS_CYLINDER: -1.75
OS_SPHERE: +0.50
OD_AXIS: 095
OD_CYLINDER: -1.75
OD_VA1: 20/20
OS_SPHERE: +0.75
OD_ADD: +2.75
OD_VA1: 20/20
OD_AXIS: 090
OD_ADD: +2.75
OS_VA1: 20/20
OD_VA2: 20/20
OD_SPHERE: +0.50
OS_CYLINDER: -1.75
OS_AXIS: 090
OD_CYLINDER: -1.75

## 2025-06-13 ASSESSMENT — SUPERFICIAL PUNCTATE KERATITIS (SPK)
OD_SPK: T 1+
OS_SPK: T 1+

## 2025-06-13 ASSESSMENT — CONFRONTATIONAL VISUAL FIELD TEST (CVF)
OD_FINDINGS: FULL
OS_FINDINGS: FULL

## 2025-06-13 ASSESSMENT — KERATOMETRY
OS_K1POWER_DIOPTERS: 43.50
OS_AXISANGLE_DEGREES: 178
OD_AXISANGLE_DEGREES: 175
OS_K2POWER_DIOPTERS: 45.25
OD_K1POWER_DIOPTERS: 43.25
METHOD_AUTO_MANUAL: AUTO
OD_K2POWER_DIOPTERS: 45.00

## 2025-06-13 ASSESSMENT — REFRACTION_AUTOREFRACTION
OS_AXIS: 088
OS_SPHERE: +0.75
OD_CYLINDER: -2.50
OD_AXIS: 087
OD_SPHERE: +0.75
OS_CYLINDER: -2.50

## 2025-06-13 ASSESSMENT — PACHYMETRY
OS_CT_UM: 502
OD_CT_UM: 508
OS_CT_CORRECTION: 3
OD_CT_CORRECTION: 3

## 2025-06-13 ASSESSMENT — TONOMETRY
OS_IOP_MMHG: 13
OD_IOP_MMHG: 12

## 2025-06-13 ASSESSMENT — VISUAL ACUITY
OS_BCVA: 20/20-
OD_BCVA: 20/25

## 2025-07-03 ENCOUNTER — OFFICE (OUTPATIENT)
Dept: URBAN - METROPOLITAN AREA CLINIC 38 | Facility: CLINIC | Age: 75
Setting detail: OPHTHALMOLOGY
End: 2025-07-03
Payer: MEDICARE

## 2025-07-03 DIAGNOSIS — H40.1122: ICD-10-CM

## 2025-07-03 DIAGNOSIS — H40.1111: ICD-10-CM

## 2025-07-03 PROCEDURE — 99213 OFFICE O/P EST LOW 20 MIN: CPT | Performed by: STUDENT IN AN ORGANIZED HEALTH CARE EDUCATION/TRAINING PROGRAM

## 2025-07-03 ASSESSMENT — KERATOMETRY
OS_K1POWER_DIOPTERS: 42.75
OD_K2POWER_DIOPTERS: 45.00
OS_AXISANGLE_DEGREES: 001
METHOD_AUTO_MANUAL: AUTO
OS_K2POWER_DIOPTERS: 45.25
OD_AXISANGLE_DEGREES: 180
OD_K1POWER_DIOPTERS: 43.00

## 2025-07-03 ASSESSMENT — REFRACTION_MANIFEST
OU_VA: 20/20-2
OD_CYLINDER: -1.75
OS_SPHERE: +0.75
OD_CYLINDER: -1.75
OD_AXIS: 095
OD_SPHERE: +0.50
OS_AXIS: 090
OD_ADD: +2.75
OS_SPHERE: +0.50
OS_ADD: +2.75
OD_SPHERE: +0.25
OS_CYLINDER: -1.75
OS_VA2: 20/20
OS_CYLINDER: -1.75
OS_VA1: 20/20
OS_ADD: +2.75
OD_VA1: 20/20
OD_AXIS: 090
OD_VA2: 20/20
OS_AXIS: 095
OD_ADD: +2.75
OD_VA1: 20/20
OS_VA1: 20/20

## 2025-07-03 ASSESSMENT — CONFRONTATIONAL VISUAL FIELD TEST (CVF)
OS_FINDINGS: FULL
OD_FINDINGS: FULL

## 2025-07-03 ASSESSMENT — REFRACTION_CURRENTRX
OS_ADD: +2.75
OD_OVR_VA: 20/
OS_OVR_VA: 20/
OD_SPHERE: +0.50
OS_CYLINDER: -1.75
OD_SPHERE: +0.75
OS_AXIS: 087
OD_AXIS: 091
OS_OVR_VA: 20/
OD_ADD: +2.75
OD_VPRISM_DIRECTION: PROGS
OD_CYLINDER: -1.75
OS_OVR_VA: 20/
OD_OVR_VA: 20/
OD_OVR_VA: 20/
OS_ADD: +2.75
OD_AXIS: 112
OD_CYLINDER: -1.75
OS_VPRISM_DIRECTION: PROGS
OD_CYLINDER: -1.75
OD_VPRISM_DIRECTION: PROGS
OS_AXIS: 097
OS_SPHERE: +0.75
OD_AXIS: 085
OS_ADD: +3.00
OS_CYLINDER: -1.50
OS_AXIS: 097
OS_CYLINDER: -1.50
OS_SPHERE: +1.00
OD_SPHERE: +0.50
OD_VPRISM_DIRECTION: PROGS
OS_VPRISM_DIRECTION: PROGS
OS_SPHERE: +0.75
OD_ADD: +3.25
OS_VPRISM_DIRECTION: PROGS
OD_ADD: +2.75

## 2025-07-03 ASSESSMENT — VISUAL ACUITY
OS_BCVA: 20/20
OD_BCVA: 20/20-2

## 2025-07-03 ASSESSMENT — REFRACTION_AUTOREFRACTION
OD_SPHERE: +1.00
OS_CYLINDER: -2.75
OD_AXIS: 090
OD_CYLINDER: -2.75
OS_AXIS: 092
OS_SPHERE: +1.25

## 2025-07-03 ASSESSMENT — PACHYMETRY
OS_CT_CORRECTION: 3
OS_CT_UM: 502
OD_CT_UM: 508
OD_CT_CORRECTION: 3

## 2025-07-03 ASSESSMENT — TONOMETRY
OS_IOP_MMHG: 10
OD_IOP_MMHG: 11

## 2025-08-08 ENCOUNTER — APPOINTMENT (OUTPATIENT)
Dept: ENDOCRINOLOGY | Facility: CLINIC | Age: 75
End: 2025-08-08
Payer: MEDICARE

## 2025-08-08 ENCOUNTER — RESULT REVIEW (OUTPATIENT)
Age: 75
End: 2025-08-08

## 2025-08-08 VITALS
DIASTOLIC BLOOD PRESSURE: 70 MMHG | HEART RATE: 72 BPM | HEIGHT: 64 IN | WEIGHT: 128 LBS | BODY MASS INDEX: 21.85 KG/M2 | OXYGEN SATURATION: 96 % | SYSTOLIC BLOOD PRESSURE: 102 MMHG

## 2025-08-08 DIAGNOSIS — E78.2 MIXED HYPERLIPIDEMIA: ICD-10-CM

## 2025-08-08 DIAGNOSIS — E04.2 NONTOXIC MULTINODULAR GOITER: ICD-10-CM

## 2025-08-08 PROCEDURE — 99213 OFFICE O/P EST LOW 20 MIN: CPT

## 2025-08-09 ENCOUNTER — APPOINTMENT (OUTPATIENT)
Dept: ULTRASOUND IMAGING | Facility: CLINIC | Age: 75
End: 2025-08-09

## 2025-08-09 PROCEDURE — 76536 US EXAM OF HEAD AND NECK: CPT

## 2025-08-12 RX ORDER — BRINZOLAMIDE/BRIMONIDINE TARTRATE 10; 2 MG/ML; MG/ML
1-0.2 SUSPENSION/ DROPS OPHTHALMIC
Qty: 1 | Refills: 0 | Status: ACTIVE | COMMUNITY

## 2025-08-12 RX ORDER — TRAVOPROST 0.04 MG/ML
0 SOLUTION/ DROPS OPHTHALMIC
Refills: 0 | Status: ACTIVE | COMMUNITY